# Patient Record
Sex: MALE | Race: WHITE | NOT HISPANIC OR LATINO | URBAN - METROPOLITAN AREA
[De-identification: names, ages, dates, MRNs, and addresses within clinical notes are randomized per-mention and may not be internally consistent; named-entity substitution may affect disease eponyms.]

---

## 2022-08-23 ENCOUNTER — TELEPHONE (OUTPATIENT)
Dept: SCHEDULING | Facility: IMAGING CENTER | Age: 22
End: 2022-08-23

## 2022-08-26 ENCOUNTER — OFFICE VISIT (OUTPATIENT)
Dept: MEDICAL GROUP | Facility: PHYSICIAN GROUP | Age: 22
End: 2022-08-26
Payer: COMMERCIAL

## 2022-08-26 DIAGNOSIS — K21.9 GASTROESOPHAGEAL REFLUX DISEASE WITHOUT ESOPHAGITIS: ICD-10-CM

## 2022-08-26 DIAGNOSIS — R07.9 CHEST PAIN, UNSPECIFIED TYPE: ICD-10-CM

## 2022-08-26 PROCEDURE — 99385 PREV VISIT NEW AGE 18-39: CPT

## 2022-08-26 ASSESSMENT — PATIENT HEALTH QUESTIONNAIRE - PHQ9: CLINICAL INTERPRETATION OF PHQ2 SCORE: 0

## 2022-08-28 VITALS
WEIGHT: 171.6 LBS | HEART RATE: 87 BPM | TEMPERATURE: 99.9 F | SYSTOLIC BLOOD PRESSURE: 134 MMHG | OXYGEN SATURATION: 96 % | DIASTOLIC BLOOD PRESSURE: 78 MMHG

## 2022-08-28 NOTE — PROGRESS NOTES
Subjective:     CC:   Chief Complaint   Patient presents with    [facility]     HPI:   Kendall Figueroa is a 21 y.o. male who presents for an annual exam. He is feeling well and has no complaints. He does not take any daily medications for the exception of Tums for occasional heart burn that can cause chest pain on inspiration after eating certain foods. He does not have any other diagnosed health conditions. He is a healthy weight and is not indicated for a cholesterol or diabetes screening. He is not interested in getting additional lab work done today.    Health Maintenance  Cholesterol Screening: N/A  Diabetes Screening: N/A  Diet: Patient eats a healthy diet of high-protein, low-carb.  Exercise: Patient exercises regularly with weight lifting  Substance Abuse: N/A  Safe in relationship. He is .     Cancer screening  -Colorectal Cancer Screening  -Lung Cancer Screening  -Prostate Cancer Screening/PSA     No screenings are indicated due to age and negative family history.    Infectious disease screening/Immunizations  --STI Screening: N/A  --Practices safe sex.  --HIV Screening: N/A   --Hepatitis C Screening: N/A  Patient is  and in a monogamous relationship.     --Immunizations:    Influenza:Refused   HPV:  Refused   Tetanus: Refused for today   COVID-19: Not completed  Other immunizations: Refused for today  Patient states he would like to do additional research before getting any of the indicated vaccines.  He is due for MCV4 and TDap.    He  has no past medical history on file.  He  has no past surgical history on file.  No family history on file.  Social History     Tobacco Use    Smoking status: Never    Smokeless tobacco: Never   Vaping Use    Vaping Use: Never used   Substance Use Topics    Alcohol use: Yes     Alcohol/week: 8.4 oz     Types: 14 Cans of beer per week    Drug use: Not Currently       Patient Active Problem List    Diagnosis Date Noted    Gastroesophageal reflux disease  without esophagitis 08/26/2022    Chest pain, unspecified 08/26/2022       Current Outpatient Medications   Medication Sig Dispense Refill    Omega-3 Fatty Acids (FISH OIL PO) Take  by mouth.       No current facility-administered medications for this visit.    (including changes today)  Allergies: Patient has no allergy information on record.    Review of Systems   Constitutional: Negative for fever, chills and malaise/fatigue.   HENT: Negative for congestion.    Eyes: Negative for pain.   Respiratory: Negative for cough and shortness of breath.    Cardiovascular: Negative for leg swelling.   Gastrointestinal: Negative for nausea, vomiting, abdominal pain and diarrhea.   Genitourinary: Negative for dysuria and hematuria.   Skin: Negative for rash.   Neurological: Negative for dizziness, focal weakness and headaches.   Endo/Heme/Allergies: Does not bleed easily.   Psychiatric/Behavioral: Negative for depression.  The patient is not nervous/anxious.      Objective:     Pulse 87   Temp 37.7 °C (99.9 °F) (Temporal)   Wt 77.8 kg (171 lb 9.6 oz)   SpO2 96%   There is no height or weight on file to calculate BMI.  Wt Readings from Last 4 Encounters:   08/26/22 77.8 kg (171 lb 9.6 oz)       Physical Exam:  Constitutional: Well-developed and well-nourished. Not diaphoretic. No distress.   Skin: Skin is warm and dry. No rash noted.  Head: Atraumatic without lesions.  Eyes: Conjunctivae and extraocular motions are normal. Pupils are equal, round, and reactive to light. No scleral icterus.   Ears:  External ears unremarkable. Tympanic membranes clear and intact.  Nose: Nares patent. Septum midline. Turbinates without erythema nor edema. No discharge.   Neck: Supple, trachea midline. Normal range of motion. No thyromegaly present. No lymphadenopathy--cervical or supraclavicular.  Cardiovascular: Regular rate and rhythm, S1 and S2 without murmur, rubs, or gallops.    Lungs: Effort normal. Clear to auscultation throughout. No  adventitious sounds. No CVA tenderness.  Abdomen: Soft, non tender, and without distention. Active bowel sounds in all four quadrants. No rebound, guarding, masses or HSM.  Extremities: No cyanosis, clubbing, erythema, nor edema. Distal pulses intact and symmetric.   Musculoskeletal: All major joints AROM full in all directions without pain.  Neurological: Alert and oriented x 3. DTRs 2+/3 and symmetric. No cranial nerve deficit. 5/5 myotomes. Sensation intact.  Psychiatric:  Behavior, mood, and affect are appropriate.    1. Gastroesophageal reflux disease without esophagitis  2. Chest pain, unspecified type  Continue use of over-the-counter antacids for relief. Patient is encouraged to reach out if it becomes a daily occurrence and is not controlled with OTCs.    HCM: Completed. Vaccines refused for today.  Labs per orders.  Vaccinations per orders.  Counseling about diet, supplements, exercise, skin care and safe sex.    Follow-up: Return in about 1 year (around 8/26/2023) for Wellness Visit.

## 2023-10-11 ENCOUNTER — TELEPHONE (OUTPATIENT)
Dept: HEALTH INFORMATION MANAGEMENT | Facility: OTHER | Age: 23
End: 2023-10-11
Payer: COMMERCIAL